# Patient Record
Sex: FEMALE
[De-identification: names, ages, dates, MRNs, and addresses within clinical notes are randomized per-mention and may not be internally consistent; named-entity substitution may affect disease eponyms.]

---

## 2024-05-24 PROBLEM — Z00.00 ENCOUNTER FOR PREVENTIVE HEALTH EXAMINATION: Status: ACTIVE | Noted: 2024-05-24

## 2024-05-31 ENCOUNTER — APPOINTMENT (OUTPATIENT)
Dept: MRI IMAGING | Facility: CLINIC | Age: 34
End: 2024-05-31
Payer: COMMERCIAL

## 2024-05-31 ENCOUNTER — OUTPATIENT (OUTPATIENT)
Dept: OUTPATIENT SERVICES | Facility: HOSPITAL | Age: 34
LOS: 1 days | End: 2024-05-31

## 2024-05-31 PROCEDURE — 72197 MRI PELVIS W/O & W/DYE: CPT | Mod: 26

## 2024-06-03 ENCOUNTER — TRANSCRIPTION ENCOUNTER (OUTPATIENT)
Age: 34
End: 2024-06-03

## 2024-12-15 ENCOUNTER — EMERGENCY (EMERGENCY)
Facility: HOSPITAL | Age: 34
LOS: 1 days | Discharge: HOME | End: 2024-12-15
Admitting: EMERGENCY MEDICINE
Payer: COMMERCIAL

## 2024-12-15 VITALS
SYSTOLIC BLOOD PRESSURE: 116 MMHG | HEART RATE: 81 BPM | TEMPERATURE: 98 F | RESPIRATION RATE: 16 BRPM | OXYGEN SATURATION: 98 % | DIASTOLIC BLOOD PRESSURE: 80 MMHG

## 2024-12-15 PROCEDURE — 99284 EMERGENCY DEPT VISIT MOD MDM: CPT
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
## 2024-12-16 NOTE — ED ADULT NURSE NOTE - PRO INTERPRETER NEED 2
[de-identified] :  Patient presents today c/o clogged ears.   He denies any change to hearing. Has ear itch and clogged sensation. He denies any past history of ear infections or surgery.  No episodes or dizziness or off balance.  English
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
## 2024-12-16 NOTE — ED PROVIDER NOTE - PATIENT PORTAL LINK FT
You can access the FollowMyHealth Patient Portal offered by [facility] by registering at the following website: [email or web address]. By joining [employer]’s FollowMyHealth portal, you will also be able to view your health information using other applications (apps) compatible with our system.

## 2024-12-16 NOTE — ED PROVIDER NOTE - PROGRESS NOTE DETAILS
Patient resting comfortably, alert, speaking in full sentences, walking with stable gait without assistance . Discussed ECG, answered all questions. Patient aware they need to follow up with PMD in the next week for further evaluation and management as needed. Strict return precautions provided including Cp, SOB, intractable N/V, worsening right arm pain, syncope. Patient amendable to plan

## 2024-12-16 NOTE — ED PROVIDER NOTE - MDM ORDERS SUBMITTED SELECTION
Mrs Gomez is a 60 year old female, current smoker with COPD, GERD, Raynauds,  HTN and MVP here with chest heaviness. She reports negative cardiac work ups in the past. Her CXR is unremarkable. Her 1st set of CE is negative. EKG is SR with no sign of ischemia  Labs notable for hypokalemia, and elevated bicarb, presumably in the setting of COPD and chronic retention.  There is no sign of acute ischemia, significant volume overload or critical valvular disease.  She does have certain risk factors for CAD, but her pain is more likely GI related. her d dimer is negative, which makes a PE or dissection very unlikely.  If her second set of CE is unremarkable, and her BP is controlled, there is no cardiac contraindication to d/c home  She must follow up closely with a cardiologist. Not Applicable

## 2024-12-16 NOTE — ED PROVIDER NOTE - NS ED ROS FT
Denies fevers, chills, nausea, vomiting, abdominal pain, chest pain, palpitations, shortness of breath, syncope/near syncope, headache, weakness, numbness, focal deficits, visual changes, dizziness

## 2024-12-16 NOTE — ED PROVIDER NOTE - PHYSICAL EXAMINATION
CONSTITUTIONAL: NAD, well developed. Sitting comfortably. No acute distress.   HEAD: normocephalic, atraumatic.   CARD: RRR, no murmur.   RESP: Normal respiratory effort;  speaking in full sentences. Lungs CTAB.   ABD: Soft, non-distended, non-tender. No-guarding or rebound.   EXT: Right arm with ROM intact. Full sensation in the right arm including the median, ulnar and radial distrubution of the right hand. <2 cap refill, right hand. 5/5 right hand . 2+ radial pulse, right.   SKIN: No rash. Warm and dry. No evidence of wound, burn or skin changes on the right hand/arm.   NEURO: Awake, alert, conversant. No focal neurologic deficits. Walking with stable gait.
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
## 2024-12-18 DIAGNOSIS — Z88.1 ALLERGY STATUS TO OTHER ANTIBIOTIC AGENTS: ICD-10-CM
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
## 2024-12-18 DIAGNOSIS — Z88.8 ALLERGY STATUS TO OTHER DRUGS, MEDICAMENTS AND BIOLOGICAL SUBSTANCES: ICD-10-CM
